# Patient Record
Sex: MALE | URBAN - METROPOLITAN AREA
[De-identification: names, ages, dates, MRNs, and addresses within clinical notes are randomized per-mention and may not be internally consistent; named-entity substitution may affect disease eponyms.]

---
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
## 2024-11-04 NOTE — PROGRESS NOTES
Addended by: JULIÁN MCDONOUGH on: 11/4/2024 03:02 PM     Modules accepted: Orders     Athletic Training  Date of Report: 2022  Name: Steve Darwin: [employer]  Sport: Basketball  : 2005  Age: 24 y.o. MRN: [id number]  Encounter:  [x] New AT Eval     [] Follow-Up Visit    [] Other:   SUBJECTIVE:  Reason for Visit:    Chief Complaint   Patient presents with    Epistaxis     Heather Cruz is a 24y.o. year old, male who presents today for evaluation of Nose bleed. Joana Tovar was seen in the [address] [place] [address] prior to the game for a nose bleed. He states that he gets frequent nosebleeds in the winter time. OBJECTIVE:   Physical Exam  Vital Signs:   [x] There were no vitals taken for this visit  Date/Time Taken         Blood Pressure         Pulse          Constitution:   Appearance: Heather Cruz is [x] alert, [x] appears stated age, and [x] in no distress. Heather Cruz general body habitus is:    [] Cachectic [] Thin [x] Normal [] Obese [] Morbidly Obese  Pulmonary: Rate   [] Fast [x] Normal [] Slow    Rhythm  [x] Regular [] Irregular   Volume [x] Adequate  [] Shallow [] Deep  Effort  [] Labored [x] Unlabored  Skin:  Color  [x] Normal [] Pale [] Cyanotic    Temperature [] Hot   [x] Warm [] Cool  [] Cold     Moisture [] Dry  [x] Moist [] Warm    Psychiatric:   [x] Good judgement and insight. [x] Oriented to [x] person, [x] place, and [x] time. [x] Mood appropriate for circumstances.   Inspection:   Skin:   [x] Intact [] Abrasion  [] Laceration  Notes:   Ecchymosis:  [x] None [] Mild  [] Moderate  [] Severe  Notes:   Atrophy:  [x] None [] Mild  [] Moderate  [] Severe  Notes:   Effusion:  [x] None [] Mild  [] Moderate  [] Severe  Notes:   Deformity:  [x] None [] Mild  [] Moderate  [] Severe  Notes:   Scar / Surgical incision(s): [] A-Scope Portals  [] Open Surgical Incision(s)  Notes:   Palpation:   Tenderness: [x] None  [] Mild [] Moderate [] Severe   at:   Crepitation: [x] None  [] Mild [] Moderate [] Severe   at:   Effusion: [x] None  [] Mild [] Moderate [] Severe   at:  Deformity:   Provocative Tests: (Not tested if not marked)   Negative Positive Positive Findings           [] []     [] []     [] []     [] []     [] []      ASSESSMENT:   Diagnosis Orders   1. Epistaxis not due to trauma       Clinical Impression: Nosebleed  Status: As Tolerated  Est. Time Missed: None  PLAN:  Treatment:  [] Rest  [] Ice   [] Wrap  [] Elevate  [] Tape  [] First Aid/Wound [] Moist Heat  [] Crutches  [] Brace  [] Splint  [] Sling  [] Immobilizer   [] Whirlpool  [] Massage  [] Pneumatic  [] Rehab/Exercise  [] Other:   Guardian Contacted: No  Comments / Instructions: Follow up with ATC as needed  Follow-Up Care / Instructions:    HEP Information:   Discharged: Yes  Electronically Signed By: AMERICO Koch ATC